# Patient Record
Sex: MALE | Race: BLACK OR AFRICAN AMERICAN | NOT HISPANIC OR LATINO | RURAL
[De-identification: names, ages, dates, MRNs, and addresses within clinical notes are randomized per-mention and may not be internally consistent; named-entity substitution may affect disease eponyms.]

---

## 2024-02-06 ENCOUNTER — OFFICE VISIT (OUTPATIENT)
Dept: FAMILY MEDICINE | Facility: CLINIC | Age: 19
End: 2024-02-06
Payer: MEDICAID

## 2024-02-06 VITALS
DIASTOLIC BLOOD PRESSURE: 86 MMHG | HEART RATE: 54 BPM | SYSTOLIC BLOOD PRESSURE: 137 MMHG | BODY MASS INDEX: 25.18 KG/M2 | TEMPERATURE: 98 F | RESPIRATION RATE: 18 BRPM | HEIGHT: 69 IN | WEIGHT: 170 LBS | OXYGEN SATURATION: 99 %

## 2024-02-06 DIAGNOSIS — Z11.59 NEED FOR HEPATITIS C SCREENING TEST: ICD-10-CM

## 2024-02-06 DIAGNOSIS — Z11.3 SCREENING EXAMINATION FOR STD (SEXUALLY TRANSMITTED DISEASE): Primary | ICD-10-CM

## 2024-02-06 DIAGNOSIS — Z13.220 SCREENING FOR HYPERLIPIDEMIA: ICD-10-CM

## 2024-02-06 LAB
CHOLEST SERPL-MCNC: 137 MG/DL (ref 0–200)
CHOLEST/HDLC SERPL: 3.3 {RATIO}
HCV AB SER QL: NORMAL
HDLC SERPL-MCNC: 41 MG/DL (ref 40–60)
HIV 1+O+2 AB SERPL QL: NORMAL
LDLC SERPL CALC-MCNC: 84 MG/DL
LDLC/HDLC SERPL: 2 {RATIO}
NONHDLC SERPL-MCNC: 96 MG/DL
SYPHILIS AB INTERPRETATION: NORMAL
TRIGL SERPL-MCNC: 61 MG/DL (ref 35–150)
VLDLC SERPL-MCNC: 12 MG/DL

## 2024-02-06 PROCEDURE — 3008F BODY MASS INDEX DOCD: CPT | Mod: ,,,

## 2024-02-06 PROCEDURE — 86780 TREPONEMA PALLIDUM: CPT | Mod: ,,, | Performed by: CLINICAL MEDICAL LABORATORY

## 2024-02-06 PROCEDURE — 87389 HIV-1 AG W/HIV-1&-2 AB AG IA: CPT | Mod: ,,, | Performed by: CLINICAL MEDICAL LABORATORY

## 2024-02-06 PROCEDURE — 86696 HERPES SIMPLEX TYPE 2 TEST: CPT | Mod: ,,, | Performed by: CLINICAL MEDICAL LABORATORY

## 2024-02-06 PROCEDURE — 87591 N.GONORRHOEAE DNA AMP PROB: CPT | Mod: ,,, | Performed by: CLINICAL MEDICAL LABORATORY

## 2024-02-06 PROCEDURE — 86803 HEPATITIS C AB TEST: CPT | Mod: ,,, | Performed by: CLINICAL MEDICAL LABORATORY

## 2024-02-06 PROCEDURE — 86695 HERPES SIMPLEX TYPE 1 TEST: CPT | Mod: ,,, | Performed by: CLINICAL MEDICAL LABORATORY

## 2024-02-06 PROCEDURE — 80061 LIPID PANEL: CPT | Mod: ,,, | Performed by: CLINICAL MEDICAL LABORATORY

## 2024-02-06 PROCEDURE — 99202 OFFICE O/P NEW SF 15 MIN: CPT | Mod: ,,,

## 2024-02-06 PROCEDURE — 87661 TRICHOMONAS VAGINALIS AMPLIF: CPT | Mod: ,,, | Performed by: CLINICAL MEDICAL LABORATORY

## 2024-02-06 PROCEDURE — 87491 CHLMYD TRACH DNA AMP PROBE: CPT | Mod: ,,, | Performed by: CLINICAL MEDICAL LABORATORY

## 2024-02-06 NOTE — ASSESSMENT & PLAN NOTE
Erick Hutchison is a 18 y.o. male  presents with request for STD testing.  He denies known exposure to STD. He denies itching. He denies odor.  He denies denies discharge. Labs today. Patient advised on safe sex practices. Patient advised to refrain for intercourse pending lab results. Return to clinic in 1 week and as needed.

## 2024-02-06 NOTE — PROGRESS NOTES
"Erick Hutchison is a 18 y.o. male  presents with request for STD testing.  He denies known exposure to STD. He denies itching. He denies odor.  He denies denies discharge. Labs today. Patient advised on safe sex practices. Patient advised to refrain for intercourse pending lab results. Return to clinic in 1 week and as needed.       Past Medical History:   Diagnosis Date    Asthma      Past Surgical History:   Procedure Laterality Date    KNEE SURGERY           ROS:  GENERAL: No fever, chills, fatigability or weight loss.  ABDOMEN: No abdominal pain. Denies nausea. Denies vomiting. No diarrhea. No constipation  BREAST: Denies pain. No lumps. No discharge.  URINARY: No incontinence, no nocturia, no frequency and no dysuria.  CARDIOVASCULAR: No chest pain. No shortness of breath. No leg cramps.  NEUROLOGICAL: No headaches. No vision changes.  : See HPI    Vitals:    02/06/24 0943   BP: 137/86   Pulse: (!) 54   Resp: 18   Temp: 98.3 °F (36.8 °C)   TempSrc: Oral   SpO2: 99%   Weight: 77.1 kg (170 lb)   Height: 5' 9" (1.753 m)         PHYSICAL EXAM:   General : normal appearance  Resp: clear bilaterally  Cards: s1, s2, no murmurs.  GI: soft, nontender, nondistended  :  discharge, odor      ASSESSMENT and PLAN:  Screening examination for STD (sexually transmitted disease)  -     Chlamydia/GC, PCR  -     HIV 1/2 Ag/Ab (4th Gen)  -     HSV 1 & 2, IgG  -     Treponema Pallidum (Syphillis) Antibody  -     Trichomonas vaginalis by PCR  -     Hepatitis C Antibody  -     Lipid Panel        Pending labs  F/u 1 week       FOLLOW UP: PRN lack of improvement.    "

## 2024-02-07 ENCOUNTER — TELEPHONE (OUTPATIENT)
Dept: FAMILY MEDICINE | Facility: CLINIC | Age: 19
End: 2024-02-07
Payer: MEDICAID

## 2024-02-07 ENCOUNTER — OFFICE VISIT (OUTPATIENT)
Dept: FAMILY MEDICINE | Facility: CLINIC | Age: 19
End: 2024-02-07
Payer: MEDICAID

## 2024-02-07 VITALS
WEIGHT: 170 LBS | OXYGEN SATURATION: 100 % | BODY MASS INDEX: 25.18 KG/M2 | DIASTOLIC BLOOD PRESSURE: 81 MMHG | HEIGHT: 69 IN | SYSTOLIC BLOOD PRESSURE: 135 MMHG | HEART RATE: 59 BPM | RESPIRATION RATE: 19 BRPM | TEMPERATURE: 98 F

## 2024-02-07 DIAGNOSIS — A74.9 CHLAMYDIA INFECTION: Primary | ICD-10-CM

## 2024-02-07 DIAGNOSIS — Z71.2 ENCOUNTER TO DISCUSS TEST RESULTS: ICD-10-CM

## 2024-02-07 LAB
CHLAMYDIA BY PCR: POSITIVE
N. GONORRHOEAE (GC) BY PCR: NEGATIVE
TRICHOMONAS NAT: NEGATIVE

## 2024-02-07 PROCEDURE — 3008F BODY MASS INDEX DOCD: CPT | Mod: ,,,

## 2024-02-07 PROCEDURE — 99212 OFFICE O/P EST SF 10 MIN: CPT | Mod: ,,,

## 2024-02-07 RX ORDER — AZITHROMYCIN 500 MG/1
1000 TABLET, FILM COATED ORAL DAILY
Qty: 2 TABLET | Refills: 0 | Status: SHIPPED | OUTPATIENT
Start: 2024-02-07 | End: 2024-02-08

## 2024-02-07 NOTE — ASSESSMENT & PLAN NOTE
Patient here today for lab results. He is positive for chlamydia. He reports his partner is aware. He has not symptoms or complaints. Medication sent to pharmacy. Safe sex practices discussed with patient. Patient advised to refrain from sexual intercourse until repeat testing is negative. Patient also advised to inform any other sexual partners of his diagnosis. Return to clinic in 1 week for repeat testing.       
Patient here today for lab results. He is positive for chlamydia. He reports his partner is aware. He has not symptoms or complaints. Medication sent to pharmacy. Safe sex practices discussed with patient. Patient advised to refrain from sexual intercourse until repeat testing is negative. Patient also advised to inform any other sexual partners of his diagnosis. Return to clinic in 1 week for repeat testing.       
101.3

## 2024-02-07 NOTE — PATIENT INSTRUCTIONS
Medication sent to pharmacy  Patient advised to refrain from sexual intercourse until repeat testing is negative  Patient also advised to inform any other sexual partners of his diagnosis   Return to clinic in 1 week for repeat testing.

## 2024-02-07 NOTE — TELEPHONE ENCOUNTER
Patient contacted via phone regarding lab results. Patient advised to return to clinic today to discuss lab results. Patient agrees to return to clinic today at 11am

## 2024-02-07 NOTE — PROGRESS NOTES
Parminder Wilkins NP   [address] [place] [address]     PATIENT NAME: Erick Hutchison  : 2005  DATE: 24  MRN: [id number]      Billing Provider: Parminder Wilkins NP  Level of Service:   Patient PCP Information       Provider PCP Type    Primary Doctor No General            Reason for Visit / Chief Complaint: Results       Update PCP  Update Chief Complaint         History of Present Illness / Problem Focused Workflow     Erick Hutchison presents to the clinic with Results     Patient here today for lab results. He is positive for chlamydia. He reports his partner is aware. He has not symptoms or complaints. Medication sent to pharmacy. Safe sex practices discussed with patient. Patient advised to refrain from sexual intercourse until repeat testing is negative. Patient also advised to inform any other sexual partners of his diagnosis. Return to clinic in 1 week for repeat testing.       Review of Systems     Review of Systems   Constitutional: Negative.    HENT: Negative.     Eyes: Negative.    Respiratory: Negative.     Cardiovascular: Negative.    Gastrointestinal: Negative.    Genitourinary: Negative.    Integumentary:  Negative.   Neurological: Negative.    Psychiatric/Behavioral: Negative.        Medical / Social / Family History     Past Medical History:   Diagnosis Date    Asthma        Past Surgical History:   Procedure Laterality Date    KNEE SURGERY         Social History    reports that he has never smoked. He has never used smokeless tobacco. He reports that he does not drink alcohol and does not use drugs.    Family History  's family history is not on file.    Medications and Allergies     Medications  No outpatient medications have been marked as taking for the 24 encounter (Office Visit) with Parminder Wilkins NP.       Allergies  Review of patient's allergies indicates:  No Known Allergies    Physical Examination   /81 (BP Location: Left arm,  "Patient Position: Sitting, BP Method: Medium (Automatic))   Pulse (!) 59   Temp 98.1 °F (36.7 °C) (Oral)   Resp 19   Ht 5' 9" (1.753 m)   Wt 77.1 kg (170 lb)   SpO2 100%   BMI 25.10 kg/m²    Physical Exam  Vitals reviewed.   Constitutional:       Appearance: Normal appearance.   HENT:      Head: Normocephalic and atraumatic.      Right Ear: Tympanic membrane normal.      Left Ear: Tympanic membrane normal.      Nose: Nose normal.      Mouth/Throat:      Mouth: Mucous membranes are moist.      Pharynx: Oropharynx is clear. No oropharyngeal exudate or posterior oropharyngeal erythema.   Eyes:      Extraocular Movements: Extraocular movements intact.      Conjunctiva/sclera: Conjunctivae normal.      Pupils: Pupils are equal, round, and reactive to light.   Cardiovascular:      Rate and Rhythm: Normal rate and regular rhythm.      Pulses: Normal pulses.      Heart sounds: Normal heart sounds. No murmur heard.  Pulmonary:      Effort: Pulmonary effort is normal.      Breath sounds: Normal breath sounds.   Abdominal:      General: Abdomen is flat. Bowel sounds are normal.      Palpations: Abdomen is soft.   Musculoskeletal:         General: Normal range of motion.      Cervical back: Normal range of motion and neck supple.   Lymphadenopathy:      Cervical: No cervical adenopathy.   Skin:     General: Skin is warm and dry.      Capillary Refill: Capillary refill takes less than 2 seconds.      Findings: No rash.   Neurological:      General: No focal deficit present.      Mental Status: He is alert and oriented to person, place, and time.   Psychiatric:         Mood and Affect: Mood normal.         Behavior: Behavior normal.        Assessment and Plan (including Health Maintenance)      Problem List  Smart Sets  Document Outside HM   :    Plan:   Medication sent to pharmacy  Patient advised to refrain from sexual intercourse until repeat testing is negative  Patient also advised to inform any other sexual partners " of his diagnosis   Return to clinic in 1 week for repeat testing.             Health Maintenance Due   Topic Date Due    COVID-19 Vaccine (1) Never done    HPV Vaccines (1 - Male 2-dose series) Never done    TETANUS VACCINE  Never done    Influenza Vaccine (1) Never done       Problem List Items Addressed This Visit          ID    Chlamydia infection - Primary    Relevant Medications    azithromycin (ZITHROMAX) 500 MG tablet       The patient has no Health Maintenance topics of status Not Due    No future appointments.         Signature:  Parminder Wilkins NP      [address] [place] [address]    Date of encounter: 2/7/24

## 2024-02-08 LAB
HSV TYPE 1 AB IGG INDEX: 0.06
HSV TYPE 2 AB IGG INDEX: 5.81
HSV1 IGG SER QL: NEGATIVE
HSV2 IGG SER QL: POSITIVE

## 2024-02-22 ENCOUNTER — OFFICE VISIT (OUTPATIENT)
Dept: FAMILY MEDICINE | Facility: CLINIC | Age: 19
End: 2024-02-22
Payer: MEDICAID

## 2024-02-22 VITALS
RESPIRATION RATE: 18 BRPM | HEART RATE: 68 BPM | OXYGEN SATURATION: 100 % | WEIGHT: 173 LBS | TEMPERATURE: 98 F | HEIGHT: 69 IN | SYSTOLIC BLOOD PRESSURE: 111 MMHG | DIASTOLIC BLOOD PRESSURE: 62 MMHG | BODY MASS INDEX: 25.62 KG/M2

## 2024-02-22 DIAGNOSIS — Z11.3 SCREENING EXAMINATION FOR STD (SEXUALLY TRANSMITTED DISEASE): Primary | ICD-10-CM

## 2024-02-22 DIAGNOSIS — A74.9 CHLAMYDIA INFECTION: ICD-10-CM

## 2024-02-22 PROCEDURE — 3008F BODY MASS INDEX DOCD: CPT | Mod: ,,,

## 2024-02-22 PROCEDURE — 99212 OFFICE O/P EST SF 10 MIN: CPT | Mod: ,,,

## 2024-02-22 PROCEDURE — 87591 N.GONORRHOEAE DNA AMP PROB: CPT | Mod: ,,, | Performed by: CLINICAL MEDICAL LABORATORY

## 2024-02-22 PROCEDURE — 87491 CHLMYD TRACH DNA AMP PROBE: CPT | Mod: ,,, | Performed by: CLINICAL MEDICAL LABORATORY

## 2024-02-22 PROCEDURE — 3074F SYST BP LT 130 MM HG: CPT | Mod: ,,,

## 2024-02-22 PROCEDURE — 3078F DIAST BP <80 MM HG: CPT | Mod: ,,,

## 2024-02-23 LAB
CHLAMYDIA BY PCR: NEGATIVE
N. GONORRHOEAE (GC) BY PCR: NEGATIVE

## 2024-02-23 NOTE — ASSESSMENT & PLAN NOTE
Erick Hutchison is a 18 y.o. female who presents for STD follow up. Previously positive of chlamydia. He reports completing antibiotic.  He denies any penile discharge or dysuria. Labs today. Refrain from sexual intercourse. Safe sex practices discussed in detail. Return to clinic in 5 days for lab results.

## 2024-02-23 NOTE — PROGRESS NOTES
"Subjective:      Erick Hutchison is a 18 y.o. female who presents for STD follow up. Previously positive of chlamydia. He reports completing antibiotic.  He denies any penile discharge or dysuria. Labs today. Refrain from sexual intercourse. Safe sex practices discussed in detail. Return to clinic in 5 days for lab results.     Review of Systems  Pertinent ROS negative     Objective:      /62 (BP Location: Left arm, Patient Position: Sitting, BP Method: Large (Automatic))   Pulse 68   Temp 98.2 °F (36.8 °C) (Oral)   Resp 18   Ht 5' 9" (1.753 m)   Wt 78.5 kg (173 lb)   SpO2 100%   BMI 25.55 kg/m²   General:   No distress   Pelvic:  Deferred, denies ss        Assessment:     Constitutional:       Appearance: Normal appearance.   Cardiovascular:      Rate and Rhythm: Normal rate and regular rhythm.      Pulses: Normal pulses.      Heart sounds: Normal heart sounds. No murmur heard.  Pulmonary:      Effort: Pulmonary effort is normal.      Breath sounds: Normal breath sounds.   Abdominal:      General: Abdomen is flat. Bowel sounds are normal.      Palpations: Abdomen is soft.   Skin:     General: Skin is warm and dry.      Capillary Refill: Capillary refill takes less than 2 seconds.      Findings: No rash.   Neurological:      General: No focal deficit present.      Mental Status: He is alert and oriented to person, place, and time.   Psychiatric:         Mood and Affect: Mood normal.         Behavior: Behavior normal.      Encounter Diagnoses   Name Primary?    Screening examination for STD (sexually transmitted disease) Yes    Chlamydia infection     BMI 25.0-25.9,adult           Plan:     Discussed safe sexual practice in detail  Avoid intercourse for 7-10 days after pt and partner are treated  Return to clinic in 5 days for lab results.    "

## 2024-02-23 NOTE — PATIENT INSTRUCTIONS
Labs today.   Refrain from sexual intercourse  Safe sex practices discussed in detail  Return to clinic in 5 days for lab results.

## 2024-05-13 PROBLEM — Z13.220 SCREENING FOR HYPERLIPIDEMIA: Status: RESOLVED | Noted: 2024-02-06 | Resolved: 2024-05-13

## 2024-05-30 ENCOUNTER — TELEPHONE (OUTPATIENT)
Dept: FAMILY MEDICINE | Facility: CLINIC | Age: 19
End: 2024-05-30
Payer: MEDICAID

## 2024-05-30 NOTE — TELEPHONE ENCOUNTER
----- Message from Lcuía Pritchett sent at 5/1/2024 12:49 PM CDT -----  Please call with lab results from Feb. Call back # [phone]-Vipin Maloney

## 2024-06-21 NOTE — TELEPHONE ENCOUNTER
Phoned patient, no answer. Left voicemail that one lab was abnormal but could not give results over the phone to access through [facility] or he could come in.

## 2024-06-21 NOTE — TELEPHONE ENCOUNTER
Please let patient know one of his test was abnormal, but we cannot give the results over the phone. He can access [facility] to view labs or come in to discuss his results.

## 2024-06-25 ENCOUNTER — PATIENT MESSAGE (OUTPATIENT)
Dept: FAMILY MEDICINE | Facility: CLINIC | Age: 19
End: 2024-06-25
Payer: MEDICAID

## 2024-06-25 NOTE — TELEPHONE ENCOUNTER
Pt called back today re: labs   Informed we can not give over the phone but pt is in [place] home town    Pt was assisted to sign into my chart and he will let you know what pharmacy to use and stated you can send him a note in my chart about labs